# Patient Record
Sex: FEMALE | Race: WHITE | NOT HISPANIC OR LATINO | Employment: OTHER | URBAN - METROPOLITAN AREA
[De-identification: names, ages, dates, MRNs, and addresses within clinical notes are randomized per-mention and may not be internally consistent; named-entity substitution may affect disease eponyms.]

---

## 2017-05-09 ENCOUNTER — TELEPHONE (OUTPATIENT)
Dept: VASCULAR SURGERY | Facility: CLINIC | Age: 82
End: 2017-05-09

## 2017-05-09 NOTE — TELEPHONE ENCOUNTER
----- Message from Ratna Leigh sent at 5/9/2017 11:39 AM CDT -----  Contact: self  Returning your call.  Please call back at [phone] (rwlv)

## 2017-05-09 NOTE — TELEPHONE ENCOUNTER
----- Message from Marina Jeff sent at 5/9/2017 10:37 AM CDT -----  Contact: Patient  Patient walked in [facility] to make an appt with Dr Jones or for an ultrasound.  Please call to advise what patient needs at this time @ [phone].  Mitul/CAROL

## 2017-06-05 ENCOUNTER — TELEPHONE (OUTPATIENT)
Dept: RADIOLOGY | Facility: HOSPITAL | Age: 82
End: 2017-06-05

## 2017-06-06 ENCOUNTER — HOSPITAL ENCOUNTER (OUTPATIENT)
Dept: RADIOLOGY | Facility: HOSPITAL | Age: 82
Discharge: HOME | End: 2017-06-06
Attending: THORACIC SURGERY (CARDIOTHORACIC VASCULAR SURGERY)
Payer: MEDICARE

## 2017-06-06 DIAGNOSIS — I65.23 CAROTID STENOSIS, ASYMPTOMATIC, BILATERAL: ICD-10-CM

## 2017-06-06 PROCEDURE — 93880 EXTRACRANIAL BILAT STUDY: CPT | Mod: 26,,, | Performed by: RADIOLOGY

## 2017-06-06 PROCEDURE — 93880 EXTRACRANIAL BILAT STUDY: CPT | Mod: TC,PO

## 2017-06-21 ENCOUNTER — TELEPHONE (OUTPATIENT)
Dept: VASCULAR SURGERY | Facility: CLINIC | Age: 82
End: 2017-06-21

## 2017-06-21 NOTE — TELEPHONE ENCOUNTER
----- Message from Abebe Harris sent at 6/21/2017  8:06 AM CDT -----  Contact: pt  She's calling in regards to being worked into the drs schedule on Thursday 6/22/17 in [place], the appt was accidentally canceled, please advise, [phone] (home)

## 2017-06-22 ENCOUNTER — OFFICE VISIT (OUTPATIENT)
Dept: VASCULAR SURGERY | Facility: CLINIC | Age: 82
End: 2017-06-22
Payer: MEDICARE

## 2017-06-22 VITALS
HEIGHT: 61 IN | SYSTOLIC BLOOD PRESSURE: 152 MMHG | HEART RATE: 68 BPM | WEIGHT: 105 LBS | DIASTOLIC BLOOD PRESSURE: 62 MMHG | BODY MASS INDEX: 19.83 KG/M2

## 2017-06-22 DIAGNOSIS — I65.23 BILATERAL CAROTID ARTERY STENOSIS: Primary | ICD-10-CM

## 2017-06-22 DIAGNOSIS — I65.23 BILATERAL CAROTID ARTERY OCCLUSION: Primary | ICD-10-CM

## 2017-06-22 PROCEDURE — 1126F AMNT PAIN NOTED NONE PRSNT: CPT | Mod: S$GLB,,, | Performed by: THORACIC SURGERY (CARDIOTHORACIC VASCULAR SURGERY)

## 2017-06-22 PROCEDURE — 1159F MED LIST DOCD IN RCRD: CPT | Mod: S$GLB,,, | Performed by: THORACIC SURGERY (CARDIOTHORACIC VASCULAR SURGERY)

## 2017-06-22 PROCEDURE — 99999 PR PBB SHADOW E&M-EST. PATIENT-LVL III: CPT | Mod: PBBFAC,,, | Performed by: THORACIC SURGERY (CARDIOTHORACIC VASCULAR SURGERY)

## 2017-06-22 PROCEDURE — 99214 OFFICE O/P EST MOD 30 MIN: CPT | Mod: S$GLB,,, | Performed by: THORACIC SURGERY (CARDIOTHORACIC VASCULAR SURGERY)

## 2017-06-22 NOTE — PROGRESS NOTES
OFFICE VISIT NOTE    Ms. Molina returns for followup of carotid artery stenosis.  She denies any   amaurosis fugax, TIA symptoms or strokes.    PAST MEDICAL HISTORY:  Carotid stenosis, hypertension, hyperlipidemia.    PAST SURGICAL HISTORY:  Appendectomy, hysterectomy, oophorectomy, breast cyst   excision.    ALLERGIES:  Sulfa.    MEDICATIONS:  Xanax, Norvasc, Ecotrin, Nexium, Flonase, Lasix, Norco, Prinzide,   Zocor.    FAMILY HISTORY:  Diabetes, hypertension and colon cancer.    SOCIAL HISTORY:  She denies alcohol or tobacco use.    REVISION OF SYSTEMS:  She denies any symptoms of amaurosis fugax, TIAs or   strokes.  All other systems were reviewed and are negative.    PHYSICAL EXAMINATION:  VITAL SIGNS:  Blood pressure is 152/62, respiratory rate is 16, heart rate 68,   height 5 feet 1 inch, weight 105 pounds.  GENERAL:  She is awake and alert, in no apparent distress.  HEENT:  Head is normocephalic without any masses, atraumatic.  Pupils are equal,   round, reactive.  Sclerae are anicteric.  NECK:  Supple.  Trachea midline.  No masses.  LUNGS:  Clear.  HEART:  Has a regular rate and rhythm.  ABDOMEN:  Soft and nontender.  No masses.  Bowel sounds are present.  EXTREMITIES:  Feet are pink, warm with good capillary refill.  No clubbing.  No   edema.  No hyperpigmentation and no cyanosis.  NEUROLOGIC:  Awake, alert and oriented.  No lateralizing neurologic deficits.    Gait is within normal limits.    Ultrasound of the carotid artery showed 50% to 69% stenoses of bilateral carotid   arteries.  However, the radiologist noted that the velocities, particularly on   the right, may be falsely elevated due to vessel tortuosity.  Mild   atherosclerosis was noted in the left internal carotid artery.    IMPRESSION:  1.  Asymptomatic moderate bilateral internal carotid artery stenoses.  2.  Hypertension.  3.  Hyperlipidemia.    RECOMMENDATIONS:  The patient will return in one year with a carotid duplex   scan.      ANA/PN   dd: 06/22/2017 09:29:01 (RICHART)  td: 06/22/2017 19:33:51 (CHEVY)  Doc ID   #[id number]  Job ID #[id number]    CC:

## 2018-05-07 ENCOUNTER — TELEPHONE (OUTPATIENT)
Dept: VASCULAR SURGERY | Facility: CLINIC | Age: 83
End: 2018-05-07

## 2018-05-07 NOTE — TELEPHONE ENCOUNTER
----- Message from Diana Tomas sent at 5/7/2018 11:39 AM CDT -----  Contact: self [phone]  Would like to schedule annual follow-up appointment. Please call back at [phone].  Md Bernardino

## 2018-06-04 ENCOUNTER — HOSPITAL ENCOUNTER (OUTPATIENT)
Dept: RADIOLOGY | Facility: HOSPITAL | Age: 83
Discharge: HOME | End: 2018-06-04
Attending: THORACIC SURGERY (CARDIOTHORACIC VASCULAR SURGERY)
Payer: MEDICARE

## 2018-06-04 DIAGNOSIS — I65.23 BILATERAL CAROTID ARTERY OCCLUSION: ICD-10-CM

## 2018-06-04 PROCEDURE — 93880 EXTRACRANIAL BILAT STUDY: CPT | Mod: TC,PO

## 2018-06-04 PROCEDURE — 93880 EXTRACRANIAL BILAT STUDY: CPT | Mod: 26,,, | Performed by: RADIOLOGY

## 2018-06-07 ENCOUNTER — OFFICE VISIT (OUTPATIENT)
Dept: VASCULAR SURGERY | Facility: CLINIC | Age: 83
End: 2018-06-07
Payer: MEDICARE

## 2018-06-07 VITALS
HEART RATE: 71 BPM | DIASTOLIC BLOOD PRESSURE: 61 MMHG | SYSTOLIC BLOOD PRESSURE: 122 MMHG | HEIGHT: 61 IN | WEIGHT: 109 LBS | BODY MASS INDEX: 20.58 KG/M2

## 2018-06-07 DIAGNOSIS — I65.23 BILATERAL CAROTID ARTERY STENOSIS: Primary | ICD-10-CM

## 2018-06-07 PROCEDURE — 99213 OFFICE O/P EST LOW 20 MIN: CPT | Mod: S$GLB,,, | Performed by: THORACIC SURGERY (CARDIOTHORACIC VASCULAR SURGERY)

## 2018-06-07 PROCEDURE — 99999 PR PBB SHADOW E&M-EST. PATIENT-LVL III: CPT | Mod: PBBFAC,,, | Performed by: THORACIC SURGERY (CARDIOTHORACIC VASCULAR SURGERY)

## 2018-06-07 NOTE — PROGRESS NOTES
OFFICE VISIT NOTE    HISTORY OF PRESENT ILLNESS:  Ms. Medina Molina is an 82-year-old female with a   carotid artery stenoses.  Her previous ultrasound one year ago was reported as   50-69% stenosis, although the radiologist had suggested that the elevations in   velocity were probably due to tortuosity.  The patient denies any amaurosis   fugax, TIA symptoms or strokes.    PAST MEDICAL HISTORY:  Carotid artery stenosis, hyperlipidemia, hypertension and   arthritis.    PAST SURGICAL HISTORY:  Appendectomy, hysterectomy, oophorectomy, breast cyst   excision.    ALLERGIES:  Sulfa.    MEDICATIONS:  Xanax, Norvasc, Ecotrin, Nexium, Flonase, Lasix, Norco, Prinzide,   Zocor.    FAMILY HISTORY:  Diabetes mellitus, hypertension and colon cancer.    SOCIAL HISTORY:  She never smoked cigarettes.  Denies alcohol use.  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  She also   states that she is also active in her garden at home.    REVISION OF SYSTEMS:  She denies any amaurosis fugax, TIA or stroke symptoms.    All other systems are reviewed and are negative.    PHYSICAL EXAMINATION:  VITAL SIGNS:  Blood pressure is 122/61, respiratory rate is 18, heart rate 71,   height 5 feet 1 inch, weight 109 pounds.  GENERAL:  She is awake and alert, in no apparent distress.  HEENT:  Head is normocephalic.  Pupils are equal, round, reactive.  Sclerae are   anicteric.  NECK:  Supple.  Trachea midline.  No masses.  LUNGS:  Clear.  HEART:  Has a regular rate and rhythm.  ABDOMEN:  Soft and nontender.  No masses.  Bowel sounds are present.  EXTREMITIES:  Feet are pink and warm with good capillary refill.  There is no   edema.  No cyanosis, hyperpigmentation or ulcers.  NEUROLOGIC:  Awake, alert and oriented.  No lateralizing neurologic deficits.    Strength is 5/5 in her upper extremities.  Gait is normal.    STUDIES:  Ultrasound of the carotid arteries again was reported as tortuous   carotid arteries.   At this time,  the radiologist states that grayscale images   suggest less than 20% right internal carotid artery stenosis and less than 50%   left internal carotid artery stenosis.    IMPRESSION:  1.  Asymptomatic mild bilateral internal carotid artery stenoses.  2.  Hypertension.  3.  Hyperlipidemia.  4.  Arthritis.    RECOMMENDATIONS:  We will repeat a carotid duplex scan in one year.      PERCY  dd: 06/07/2018 08:34:03 (CDT)  td: 06/07/2018 09:15:35 (CDT)  Doc ID   #[id number]  Job ID #[id number]    CC:

## 2018-12-28 DIAGNOSIS — I65.23 BILATERAL CAROTID ARTERY OCCLUSION: Primary | ICD-10-CM

## 2019-06-25 ENCOUNTER — TELEPHONE (OUTPATIENT)
Dept: VASCULAR SURGERY | Facility: CLINIC | Age: 84
End: 2019-06-25

## 2019-06-25 NOTE — TELEPHONE ENCOUNTER
Recall letter received, carotid ultrasound scheduled on Monday, 7/1/19, 1:15 pm, at the [facility].  Informed once Dr Fletcher reviews her results we will contact her with further recommendations.  Voices understanding and is agreeable.

## 2019-06-25 NOTE — TELEPHONE ENCOUNTER
----- Message from Gabriel Rodríguez sent at 6/25/2019 10:44 AM CDT -----  Contact: Patient  [phone]  Type:  Sooner Apoointment Request    Caller is requesting a sooner appointment.  Caller declined first available appointment listed below.  Caller will not accept being placed on the waitlist and is requesting a message be sent to doctor.    Name of Caller:  Patient  [phone]    When is the first available appointment? Nono available.    Symptoms:  F/U carotid care    Best Call Back Number:  Patient  [phone]    Additional Information:  Advised needs to see Dr Jones for F/U carotid stenosis care. Please call to schedule.

## 2019-07-01 ENCOUNTER — HOSPITAL ENCOUNTER (OUTPATIENT)
Dept: RADIOLOGY | Facility: HOSPITAL | Age: 84
Discharge: HOME | End: 2019-07-01
Attending: THORACIC SURGERY (CARDIOTHORACIC VASCULAR SURGERY)
Payer: MEDICARE

## 2019-07-01 DIAGNOSIS — I65.23 BILATERAL CAROTID ARTERY OCCLUSION: ICD-10-CM

## 2019-07-01 PROCEDURE — 93880 EXTRACRANIAL BILAT STUDY: CPT | Mod: TC,PO

## 2019-07-01 PROCEDURE — 93880 US CAROTID BILATERAL: ICD-10-PCS | Mod: 26,,, | Performed by: RADIOLOGY

## 2019-07-01 PROCEDURE — 93880 EXTRACRANIAL BILAT STUDY: CPT | Mod: 26,,, | Performed by: RADIOLOGY

## 2019-07-23 DIAGNOSIS — I65.23 BILATERAL CAROTID ARTERY STENOSIS: Primary | ICD-10-CM

## 2020-06-10 ENCOUNTER — TELEPHONE (OUTPATIENT)
Dept: VASCULAR SURGERY | Facility: CLINIC | Age: 85
End: 2020-06-10

## 2020-06-10 NOTE — TELEPHONE ENCOUNTER
----- Message from Kojo Prather sent at 6/10/2020 10:35 AM CDT -----  Contact: same  Patient called in and stated she got her recall letter to call office and schedule her appointment for anytime after 7/22/2020.  Patient call back number is [phone]

## 2020-06-10 NOTE — TELEPHONE ENCOUNTER
12 month follow up carotid ultrasound scheduled at the [facility] on 7/16 @ 0845.  Informed once Dr Fletcher reviews her results we will contact her with his recommendations.  Voices understanding and is agreeable.

## 2020-07-14 ENCOUNTER — TELEPHONE (OUTPATIENT)
Dept: RADIOLOGY | Facility: HOSPITAL | Age: 85
End: 2020-07-14

## 2020-07-15 ENCOUNTER — HOSPITAL ENCOUNTER (OUTPATIENT)
Dept: RADIOLOGY | Facility: HOSPITAL | Age: 85
Discharge: HOME | End: 2020-07-15
Attending: THORACIC SURGERY (CARDIOTHORACIC VASCULAR SURGERY)
Payer: MEDICARE

## 2020-07-15 DIAGNOSIS — I65.23 BILATERAL CAROTID ARTERY STENOSIS: ICD-10-CM

## 2020-07-15 PROCEDURE — 93880 EXTRACRANIAL BILAT STUDY: CPT | Mod: TC,PO

## 2020-07-15 PROCEDURE — 93880 US CAROTID BILATERAL: ICD-10-PCS | Mod: 26,,, | Performed by: RADIOLOGY

## 2020-07-15 PROCEDURE — 93880 EXTRACRANIAL BILAT STUDY: CPT | Mod: 26,,, | Performed by: RADIOLOGY
